# Patient Record
Sex: MALE | Race: WHITE | NOT HISPANIC OR LATINO | Employment: FULL TIME | URBAN - NONMETROPOLITAN AREA
[De-identification: names, ages, dates, MRNs, and addresses within clinical notes are randomized per-mention and may not be internally consistent; named-entity substitution may affect disease eponyms.]

---

## 2021-04-06 ENCOUNTER — IMMUNIZATION (OUTPATIENT)
Dept: VACCINE CLINIC | Facility: HOSPITAL | Age: 61
End: 2021-04-06

## 2021-04-06 PROCEDURE — 0001A: CPT | Performed by: INTERNAL MEDICINE

## 2021-04-06 PROCEDURE — 91300 HC SARSCOV02 VAC 30MCG/0.3ML IM: CPT | Performed by: INTERNAL MEDICINE

## 2021-04-28 ENCOUNTER — IMMUNIZATION (OUTPATIENT)
Dept: VACCINE CLINIC | Facility: HOSPITAL | Age: 61
End: 2021-04-28

## 2021-04-28 PROCEDURE — 91300 HC SARSCOV02 VAC 30MCG/0.3ML IM: CPT | Performed by: INTERNAL MEDICINE

## 2021-04-28 PROCEDURE — 0002A: CPT | Performed by: INTERNAL MEDICINE

## 2023-09-16 ENCOUNTER — HOSPITAL ENCOUNTER (EMERGENCY)
Facility: HOSPITAL | Age: 63
Discharge: HOME | End: 2023-09-16
Attending: EMERGENCY MEDICINE
Payer: OTHER MISCELLANEOUS

## 2023-09-16 ENCOUNTER — APPOINTMENT (OUTPATIENT)
Dept: CT IMAGING | Facility: HOSPITAL | Age: 63
End: 2023-09-16
Payer: OTHER MISCELLANEOUS

## 2023-09-16 ENCOUNTER — APPOINTMENT (OUTPATIENT)
Dept: GENERAL RADIOLOGY | Facility: HOSPITAL | Age: 63
End: 2023-09-16
Payer: OTHER MISCELLANEOUS

## 2023-09-16 VITALS
OXYGEN SATURATION: 96 % | BODY MASS INDEX: 35.94 KG/M2 | HEART RATE: 81 BPM | WEIGHT: 229 LBS | RESPIRATION RATE: 18 BRPM | HEIGHT: 67 IN | SYSTOLIC BLOOD PRESSURE: 150 MMHG | DIASTOLIC BLOOD PRESSURE: 90 MMHG | TEMPERATURE: 97.7 F

## 2023-09-16 DIAGNOSIS — S19.9XXA INJURY OF NECK, INITIAL ENCOUNTER: Primary | ICD-10-CM

## 2023-09-16 DIAGNOSIS — S24.109A: ICD-10-CM

## 2023-09-16 PROCEDURE — 99284 EMERGENCY DEPT VISIT MOD MDM: CPT

## 2023-09-16 PROCEDURE — 72128 CT CHEST SPINE W/O DYE: CPT

## 2023-09-16 PROCEDURE — 72125 CT NECK SPINE W/O DYE: CPT

## 2023-09-16 PROCEDURE — 71045 X-RAY EXAM CHEST 1 VIEW: CPT

## 2023-09-16 PROCEDURE — 70450 CT HEAD/BRAIN W/O DYE: CPT

## 2023-09-16 NOTE — ED NOTES
Attempted to apply a c collar to this pt at this time, pt still refused after this RN explained the importance of the collar.
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
## 2023-09-21 ENCOUNTER — TRANSCRIBE ORDERS (OUTPATIENT)
Dept: PHYSICAL THERAPY | Facility: CLINIC | Age: 63
End: 2023-09-21
Payer: COMMERCIAL
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
## 2023-09-21 DIAGNOSIS — S29.012A STRAIN OF MUSCLE AND TENDON OF BACK WALL OF THORAX, INITIAL ENCOUNTER: ICD-10-CM

## 2023-09-21 DIAGNOSIS — S86.911A STRAIN OF UNSPECIFIED MUSCLE(S) AND TENDON(S) AT LOWER LEG LEVEL, RIGHT LEG, INITIAL ENCOUNTER: ICD-10-CM

## 2023-09-21 DIAGNOSIS — S16.1XXA STRAIN OF NECK MUSCLE, INITIAL ENCOUNTER: ICD-10-CM

## 2023-09-21 DIAGNOSIS — S13.4XXA SPRAIN OF LIGAMENT OF CERVICAL SPINE REGION: ICD-10-CM

## 2023-09-21 DIAGNOSIS — S46.912A STRAIN OF LEFT SHOULDER, INITIAL ENCOUNTER: ICD-10-CM

## 2023-09-21 DIAGNOSIS — S86.812A STRAIN OF OTHER MUSCLE(S) AND TENDON(S) AT LOWER LEG LEVEL, LEFT LEG, INITIAL ENCOUNTER: ICD-10-CM
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
## 2023-09-26 ENCOUNTER — TREATMENT (OUTPATIENT)
Dept: PHYSICAL THERAPY | Facility: CLINIC | Age: 63
End: 2023-09-26
Payer: OTHER MISCELLANEOUS
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
## 2023-09-26 DIAGNOSIS — M54.2 ACUTE NECK PAIN: ICD-10-CM

## 2023-09-26 DIAGNOSIS — G44.321 INTRACTABLE CHRONIC POST-TRAUMATIC HEADACHE: ICD-10-CM
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
## 2023-10-03 ENCOUNTER — TREATMENT (OUTPATIENT)
Dept: PHYSICAL THERAPY | Facility: CLINIC | Age: 63
End: 2023-10-03
Payer: OTHER MISCELLANEOUS

## 2023-10-03 DIAGNOSIS — G44.321 INTRACTABLE CHRONIC POST-TRAUMATIC HEADACHE: ICD-10-CM
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
## 2023-10-03 DIAGNOSIS — M54.2 ACUTE NECK PAIN: ICD-10-CM
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
## 2023-10-05 ENCOUNTER — TREATMENT (OUTPATIENT)
Dept: PHYSICAL THERAPY | Facility: CLINIC | Age: 63
End: 2023-10-05
Payer: OTHER MISCELLANEOUS
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
## 2023-10-05 DIAGNOSIS — G44.321 INTRACTABLE CHRONIC POST-TRAUMATIC HEADACHE: ICD-10-CM

## 2023-10-05 DIAGNOSIS — M54.2 ACUTE NECK PAIN: ICD-10-CM
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
## 2023-10-10 ENCOUNTER — TREATMENT (OUTPATIENT)
Dept: PHYSICAL THERAPY | Facility: CLINIC | Age: 63
End: 2023-10-10
Payer: OTHER MISCELLANEOUS

## 2023-10-10 DIAGNOSIS — M54.2 ACUTE NECK PAIN: ICD-10-CM

## 2023-10-10 DIAGNOSIS — G44.321 INTRACTABLE CHRONIC POST-TRAUMATIC HEADACHE: ICD-10-CM
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
## 2023-10-10 PROCEDURE — 97140 MANUAL THERAPY 1/> REGIONS: CPT | Performed by: PHYSICAL THERAPIST

## 2023-10-10 PROCEDURE — 97112 NEUROMUSCULAR REEDUCATION: CPT | Performed by: PHYSICAL THERAPIST

## 2023-10-10 PROCEDURE — 97110 THERAPEUTIC EXERCISES: CPT | Performed by: PHYSICAL THERAPIST
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
## 2023-10-19 ENCOUNTER — TREATMENT (OUTPATIENT)
Dept: PHYSICAL THERAPY | Facility: CLINIC | Age: 63
End: 2023-10-19
Payer: OTHER MISCELLANEOUS

## 2023-10-19 DIAGNOSIS — G44.321 INTRACTABLE CHRONIC POST-TRAUMATIC HEADACHE: ICD-10-CM
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
## 2023-10-19 DIAGNOSIS — M54.2 ACUTE NECK PAIN: ICD-10-CM
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
## 2023-10-24 ENCOUNTER — TREATMENT (OUTPATIENT)
Dept: PHYSICAL THERAPY | Facility: CLINIC | Age: 63
End: 2023-10-24
Payer: OTHER MISCELLANEOUS
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
## 2023-10-24 DIAGNOSIS — G44.321 INTRACTABLE CHRONIC POST-TRAUMATIC HEADACHE: ICD-10-CM

## 2023-10-24 DIAGNOSIS — M54.2 ACUTE NECK PAIN: ICD-10-CM
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
## 2023-10-26 ENCOUNTER — TREATMENT (OUTPATIENT)
Dept: PHYSICAL THERAPY | Facility: CLINIC | Age: 63
End: 2023-10-26
Payer: OTHER MISCELLANEOUS
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
## 2023-10-26 DIAGNOSIS — G44.321 INTRACTABLE CHRONIC POST-TRAUMATIC HEADACHE: ICD-10-CM

## 2023-10-26 DIAGNOSIS — M54.2 ACUTE NECK PAIN: ICD-10-CM
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
